# Patient Record
Sex: MALE | Race: ASIAN | Employment: FULL TIME | URBAN - METROPOLITAN AREA
[De-identification: names, ages, dates, MRNs, and addresses within clinical notes are randomized per-mention and may not be internally consistent; named-entity substitution may affect disease eponyms.]

---

## 2020-07-19 ENCOUNTER — HOSPITAL ENCOUNTER (EMERGENCY)
Facility: CLINIC | Age: 40
Discharge: HOME | End: 2020-07-19
Attending: EMERGENCY MEDICINE | Admitting: EMERGENCY MEDICINE
Payer: COMMERCIAL

## 2020-07-19 ENCOUNTER — APPOINTMENT (OUTPATIENT)
Dept: CT IMAGING | Facility: CLINIC | Age: 40
End: 2020-07-19
Attending: EMERGENCY MEDICINE
Payer: COMMERCIAL

## 2020-07-19 VITALS
DIASTOLIC BLOOD PRESSURE: 75 MMHG | TEMPERATURE: 97.9 F | RESPIRATION RATE: 16 BRPM | SYSTOLIC BLOOD PRESSURE: 130 MMHG | WEIGHT: 166.01 LBS | HEART RATE: 64 BPM | BODY MASS INDEX: 25.24 KG/M2 | OXYGEN SATURATION: 100 %

## 2020-07-19 DIAGNOSIS — R30.0 DYSURIA: ICD-10-CM

## 2020-07-19 DIAGNOSIS — N34.2 URETHRITIS: ICD-10-CM

## 2020-07-19 LAB
ALBUMIN UR-MCNC: NEGATIVE MG/DL
ANION GAP SERPL CALCULATED.3IONS-SCNC: 4 MMOL/L (ref 3–14)
APPEARANCE UR: CLEAR
BACTERIA #/AREA URNS HPF: ABNORMAL /HPF
BASOPHILS # BLD AUTO: 0 10E9/L (ref 0–0.2)
BASOPHILS NFR BLD AUTO: 0.5 %
BILIRUB UR QL STRIP: NEGATIVE
BUN SERPL-MCNC: 10 MG/DL (ref 7–30)
CALCIUM SERPL-MCNC: 8.9 MG/DL (ref 8.5–10.1)
CHLORIDE SERPL-SCNC: 106 MMOL/L (ref 94–109)
CO2 SERPL-SCNC: 30 MMOL/L (ref 20–32)
COLOR UR AUTO: ABNORMAL
CREAT SERPL-MCNC: 0.72 MG/DL (ref 0.66–1.25)
DIFFERENTIAL METHOD BLD: NORMAL
EOSINOPHIL # BLD AUTO: 0.1 10E9/L (ref 0–0.7)
EOSINOPHIL NFR BLD AUTO: 1.2 %
ERYTHROCYTE [DISTWIDTH] IN BLOOD BY AUTOMATED COUNT: 12.1 % (ref 10–15)
GFR SERPL CREATININE-BSD FRML MDRD: >90 ML/MIN/{1.73_M2}
GLUCOSE SERPL-MCNC: 98 MG/DL (ref 70–99)
GLUCOSE UR STRIP-MCNC: NEGATIVE MG/DL
HCT VFR BLD AUTO: 45.5 % (ref 40–53)
HGB BLD-MCNC: 14.4 G/DL (ref 13.3–17.7)
HGB UR QL STRIP: NEGATIVE
IMM GRANULOCYTES # BLD: 0 10E9/L (ref 0–0.4)
IMM GRANULOCYTES NFR BLD: 0.5 %
KETONES UR STRIP-MCNC: NEGATIVE MG/DL
LACTATE BLD-SCNC: 1 MMOL/L (ref 0.7–2)
LEUKOCYTE ESTERASE UR QL STRIP: ABNORMAL
LYMPHOCYTES # BLD AUTO: 1.7 10E9/L (ref 0.8–5.3)
LYMPHOCYTES NFR BLD AUTO: 27.4 %
MCH RBC QN AUTO: 28.5 PG (ref 26.5–33)
MCHC RBC AUTO-ENTMCNC: 31.6 G/DL (ref 31.5–36.5)
MCV RBC AUTO: 90 FL (ref 78–100)
MONOCYTES # BLD AUTO: 0.6 10E9/L (ref 0–1.3)
MONOCYTES NFR BLD AUTO: 9.2 %
MUCOUS THREADS #/AREA URNS LPF: PRESENT /LPF
NEUTROPHILS # BLD AUTO: 3.7 10E9/L (ref 1.6–8.3)
NEUTROPHILS NFR BLD AUTO: 61.2 %
NITRATE UR QL: NEGATIVE
NRBC # BLD AUTO: 0 10*3/UL
NRBC BLD AUTO-RTO: 0 /100
PH UR STRIP: 7 PH (ref 5–7)
PLATELET # BLD AUTO: 303 10E9/L (ref 150–450)
POTASSIUM SERPL-SCNC: 3.8 MMOL/L (ref 3.4–5.3)
RBC # BLD AUTO: 5.05 10E12/L (ref 4.4–5.9)
RBC #/AREA URNS AUTO: 1 /HPF (ref 0–2)
SODIUM SERPL-SCNC: 140 MMOL/L (ref 133–144)
SOURCE: ABNORMAL
SP GR UR STRIP: 1.01 (ref 1–1.03)
UROBILINOGEN UR STRIP-MCNC: NORMAL MG/DL (ref 0–2)
WBC # BLD AUTO: 6.1 10E9/L (ref 4–11)
WBC #/AREA URNS AUTO: 10 /HPF (ref 0–5)

## 2020-07-19 PROCEDURE — 80048 BASIC METABOLIC PNL TOTAL CA: CPT | Performed by: EMERGENCY MEDICINE

## 2020-07-19 PROCEDURE — 87591 N.GONORRHOEAE DNA AMP PROB: CPT | Performed by: EMERGENCY MEDICINE

## 2020-07-19 PROCEDURE — 87086 URINE CULTURE/COLONY COUNT: CPT | Performed by: EMERGENCY MEDICINE

## 2020-07-19 PROCEDURE — 87491 CHLMYD TRACH DNA AMP PROBE: CPT | Performed by: EMERGENCY MEDICINE

## 2020-07-19 PROCEDURE — 81001 URINALYSIS AUTO W/SCOPE: CPT | Performed by: EMERGENCY MEDICINE

## 2020-07-19 PROCEDURE — 99285 EMERGENCY DEPT VISIT HI MDM: CPT | Mod: 25

## 2020-07-19 PROCEDURE — 96365 THER/PROPH/DIAG IV INF INIT: CPT

## 2020-07-19 PROCEDURE — 87088 URINE BACTERIA CULTURE: CPT | Performed by: EMERGENCY MEDICINE

## 2020-07-19 PROCEDURE — 74177 CT ABD & PELVIS W/CONTRAST: CPT

## 2020-07-19 PROCEDURE — 25000125 ZZHC RX 250: Performed by: EMERGENCY MEDICINE

## 2020-07-19 PROCEDURE — 25000128 H RX IP 250 OP 636: Performed by: EMERGENCY MEDICINE

## 2020-07-19 PROCEDURE — 83605 ASSAY OF LACTIC ACID: CPT | Performed by: EMERGENCY MEDICINE

## 2020-07-19 PROCEDURE — 85025 COMPLETE CBC W/AUTO DIFF WBC: CPT | Performed by: EMERGENCY MEDICINE

## 2020-07-19 PROCEDURE — 87186 SC STD MICRODIL/AGAR DIL: CPT | Performed by: EMERGENCY MEDICINE

## 2020-07-19 RX ORDER — DOXYCYCLINE 100 MG/1
100 CAPSULE ORAL 2 TIMES DAILY
Qty: 14 CAPSULE | Refills: 0 | Status: SHIPPED | OUTPATIENT
Start: 2020-07-19 | End: 2020-07-26

## 2020-07-19 RX ORDER — IOPAMIDOL 755 MG/ML
83 INJECTION, SOLUTION INTRAVASCULAR ONCE
Status: COMPLETED | OUTPATIENT
Start: 2020-07-19 | End: 2020-07-19

## 2020-07-19 RX ADMIN — IOPAMIDOL 83 ML: 755 INJECTION, SOLUTION INTRAVENOUS at 13:44

## 2020-07-19 RX ADMIN — LIDOCAINE HYDROCHLORIDE 250 MG: 10 INJECTION, SOLUTION EPIDURAL; INFILTRATION; INTRACAUDAL; PERINEURAL at 15:26

## 2020-07-19 NOTE — ED PROVIDER NOTES
History   Chief Complaint  Dysuria    HPI   Jongchandler Valdovinos is a 40 year old male who presents to the emergency department for evaluation of dysuria.  Patient reports 3 weeks ago he was diagnosed with a infection was given antibiotics.  He was taking those medication for approximately 10 days.  After that time his symptoms resolved for about a week and a half.  Starting Thursday developed a low-grade fever 99.4.  Friday he then noticed that he developed some dysuria.  He has had some discomfort to the tip of the penis.  He denies any penile discharge.  Denies any testicular pain.  Denies any rectal pressure or pain.  Denies any vomiting, nausea, diarrhea, back pain.  Patient reports no concerns about his partner and is not concerned about STDs.  He is from [place] and recently was evaluated at [facility].  That is where he was initially treated for the infection.  He denies any fevers or chills or chest pain or shortness of breath.        Allergies  NKDA    Medications  The patient is currently on no regular medications.    Past Medical History  The patient denies any significant past medical history.    Past Surgical History  Vasectomy    Family History  Diabetes  Breast cancer  Hyperlipidemia    Social History  Smoking Status: Never Smoker  Smokeless Tobacco: Never used  Alcohol Use: Yes   Marital Status:   [2]    Review of Systems   All other systems reviewed and are negative.    Physical Exam     Patient Vitals for the past 24 hrs:   BP Temp Temp src Heart Rate Resp SpO2 Weight   07/19/20 1300 124/78 97.9  F (36.6  C) Oral 68 16 98 % --   07/19/20 1058 133/85 98.4  F (36.9  C) Temporal 63 18 98 % 75.3 kg (166 lb 0.1 oz)     Physical Exam    General: Resting on the bed.  Head: No obvious trauma to head.  Ears, Nose, Throat:  External ears normal.  Nose normal.    Eyes:  Conjunctivae clear.  Pupils are equal, round, and reactive.   Neck: Normal range of motion.  Neck supple.   CV: Regular  rate and rhythm.  No murmurs.      Respiratory: Effort normal and breath sounds normal.  No wheezing or crackles.   Gastrointestinal: Soft.  No distension. There is mild lower bilateral tenderness.  There is no rigidity, no rebound and no guarding.   Musculoskeletal: No CVA tenderness  Neuro: Alert. Moving all extremities appropriately.  Normal speech.    Skin: Skin is warm and dry.  No rash noted.   ; nontender, non-boggy prostate on digital rectal exam.  Nontender testicles bilateral palpation, no abnormal skin changes, no erythema or warmth.  Nontender penis to palpation, mild discomfort to the urethral meatus.    Emergency Department Course     Imaging:  Imaging findings were communicated with the patient who voiced understanding of the findings.    CT Abdomen Pelvis w Contrast  IMPRESSION:   1.  No specific CT findings to explain the patient's symptoms.    Readings per Radiology    Laboratory:  Laboratory findings were communicated with the patient who voiced understanding of the findings.    BMP: Glucose 98, o/w WNL (Creatinine: 0.72)  CBC: WBC: 6.1, HGB: 14.4, PLT: 303  Lactic acid (Resulted at 1144): 1.0    UA with Microscopic: Leukocyte Esterase: Trace (A), WBC: 10 (high), Bacteria: Few (A), Mucous: Present (A), o/w WNL    Interventions:  1526 Rocephin 250 mg in lidocaine IV    Emergency Department Course:  Past medical records, nursing notes, and vitals reviewed.    1100 I physically examined the patient as documented above.    IV was inserted and blood was drawn for laboratory testing, results above.  The patient provided a urine sample here in the emergency department. This was sent for laboratory testing, findings above.  The patient was sent for radiographs while in the emergency department, results above.     1559 I rechecked the patient and discussed the findings of their workup thus far. Jong denies any abdominal pain currently.     Findings and plan explained to the Patient. Patient discharged  home with instructions regarding supportive care, medications, and reasons to return. The importance of close follow-up was reviewed. The patient was prescribed Vibramycin.    I personally reviewed the laboratory and imaging results with the Patient and answered all related questions prior to discharge.     Impression & Plan   Medical Decision Making:  Jong Valdovinos is a 40 year old male who presents for evaluation of dysuria with discomfort at the tip of his penis.  Broad differential was considered including not limited to urethritis, UTI, pyelonephritis, nephrolithiasis, prostatitis, infected stone, diverticulitis, colitis, etc.  CBC shows no leukocytosis or anemia.  BMP shows no acute electrolyte, metabolic or renal dysfunction.  Lactates normal not concerning for severe sepsis or septic shock.  UA has trace leuk esterase and 10 white blood cells.  Gonorrhea and Chlamydia cultures are sent.  Patient is very concerned about his possible lower belly pain.  CT shows no evidence intra-abdominal process.  No evidence of acute surgical emergency.  Prostate is not boggy or tender today to indicate prostatitis.  UA does not appear consistent with UTI.  Given the mild evidence of inflammation in the tenderness at the urethral meatus this is consistent with urethritis.  Given symptoms will treat as urethritis.  No definitive signs of other STI's although patient cautioned he needs testing for HIV, syphilis, and hepatitis.  UA shows Leukocyte.  Will culture urine.  No signs of systemic symptoms.  No signs of pyelonephritis. advised that he follow-up as an outpatient with urology.  Strict return precautions were discussed.    Critical Care Time: was 0 minutes for this patient excluding procedures    Diagnosis:    ICD-10-CM    1. Dysuria  R30.0    2. Urethritis  N34.2        Disposition:  Discharged to home.    Discharge Medications:  New Prescriptions    DOXYCYCLINE HYCLATE (VIBRAMYCIN) 100 MG CAPSULE    Take 1  capsule (100 mg) by mouth 2 times daily for 7 days       Scribe Disclosure:  I, Nathan Ontiveros, am serving as a scribe at 11:35 AM on 7/19/2020 to document services personally performed by Hiral Miller MD based on my observations and the provider's statements to me.      Hiral Miller MD  07/19/20 2008

## 2020-07-19 NOTE — ED AVS SNAPSHOT
[facility] Emergency Department  [address]  [facility] [address]  Phone:  [phone]  Fax:  [phone]                                    Jong Valdovinos   MRN: [id number]    Department:  [facility] Emergency Department   Date of Visit:  7/19/2020           After Visit Summary Signature Page    I have received my discharge instructions, and my questions have been answered. I have discussed any challenges I see with this plan with the nurse or doctor.    ..........................................................................................................................................  Patient/Patient Representative Signature      ..........................................................................................................................................  Patient Representative Print Name and Relationship to Patient    ..................................................               ................................................  Date                                   Time    ..........................................................................................................................................  Reviewed by Signature/Title    ...................................................              ..............................................  Date                                               Time          22EPIC Rev 08/18

## 2020-07-19 NOTE — RESULT ENCOUNTER NOTE
Emergency Dept/Urgent Care discharge antibiotic (if prescribed): Doxycycline 100 mg PO tablet, 1 tablet (100 mg) by mouth 2 times daily for 7 days  Date of Rx (if applicable):  7/19/20  No changes in treatment per Urine culture protocol.

## 2020-07-19 NOTE — DISCHARGE INSTRUCTIONS
Please follow-up with urology in the next 2 to 3 days to ensure that your symptoms are improving.  If you develop fevers, nausea, vomiting, diarrhea, intractable pain return to the ER.  If you are unable to urinate return to the ER.    Gonorrhea and Chlamydia testing was done, they will call you within 48 hours if positive.  If negative they will not call you.

## 2020-07-20 LAB
C TRACH DNA SPEC QL NAA+PROBE: NEGATIVE
N GONORRHOEA DNA SPEC QL NAA+PROBE: NEGATIVE
SPECIMEN SOURCE: NORMAL
SPECIMEN SOURCE: NORMAL

## 2020-07-20 NOTE — RESULT ENCOUNTER NOTE
Final result for both N. Gonorrhoeae PCR and Chlamydia Trachomatis PCR are NEGATIVE.  No treatment or change in treatment per [place] ED Lab Result protocol.

## 2020-07-21 LAB
BACTERIA SPEC CULT: ABNORMAL
BACTERIA SPEC CULT: ABNORMAL
Lab: ABNORMAL
SPECIMEN SOURCE: ABNORMAL

## 2020-07-22 ENCOUNTER — TELEPHONE (OUTPATIENT)
Dept: EMERGENCY MEDICINE | Facility: CLINIC | Age: 40
End: 2020-07-22

## 2020-07-22 DIAGNOSIS — N39.0 URINARY TRACT INFECTION: ICD-10-CM

## 2020-07-22 RX ORDER — SULFAMETHOXAZOLE/TRIMETHOPRIM 800-160 MG
1 TABLET ORAL 2 TIMES DAILY
Qty: 6 TABLET | Refills: 0 | Status: SHIPPED | OUTPATIENT
Start: 2020-07-22 | End: 2020-07-25

## 2020-07-22 NOTE — TELEPHONE ENCOUNTER
[facility] notification [Adult-[facility]]    [place] ED lab result protocol used  Urine Culture Protocol    Reason for call  Notify of lab results, assess symptoms,  review ED providers recommendations/discharge instructions (if necessary) and advise per ED lab result f/u protocol    Lab Result (including Rx patient on, if applicable)  Final Urine Culture Report on 7/21/20.   Emergency Dept/Urgent Care discharge antibiotic prescribed: Doxycycline 100 mg PO tablet, 1 tablet (100 mg) by mouth 2 times daily for 7 days   Bacteria #1: >100,000 colonies/mL Klebsiella pneumoniae is [NOT TESTED] to antibiotic   Bacteria #2: 10,000 - 50,000 colonies/mL Klebsiella pneumoniae is [NOT TESTED] to antibiotic   Recommendations in treatment per  ED Lab result protocol.    Information table from ED Provider visit on 7/19/20  Symptoms reported at ED visit (Chief complaint, HPI) Jong Valdovinos is a 40 year old male who presents to the emergency department for evaluation of dysuria.  Patient reports 3 weeks ago he was diagnosed with a infection was given antibiotics.  He was taking those medication for approximately 10 days.  After that time his symptoms resolved for about a week and a half.  Starting Thursday developed a low-grade fever 99.4.  Friday he then noticed that he developed some dysuria.  He has had some discomfort to the tip of the penis.  He denies any penile discharge.  Denies any testicular pain.  Denies any rectal pressure or pain.  Denies any vomiting, nausea, diarrhea, back pain.  Patient reports no concerns about his partner and is not concerned about STDs.  He is from [place] and recently was evaluated at [facility].  That is where he was initially treated for the infection.  He denies any fevers or chills or chest pain or shortness of breath.     Significant Medical hx, if applicable (i.e. CKD, diabetes) None   Allergies No Known Allergies   Weight, if  applicable Wt Readings from Last 2 Encounters:   07/19/20 75.3 kg (166 lb 0.1 oz)   12/14/13 77.1 kg (170 lb)      Coumadin/Warfarin [Yes /No] No   Creatinine Level (mg/dl) Creatinine   Date Value Ref Range Status   07/19/2020 0.72 0.66 - 1.25 mg/dL Final      Creatinine clearance (ml/min), if applicable Creatinine clearance cannot be calculated (Unknown ideal weight.)   ED providers Impression and Plan (applicable information) Jong Valdovinos is a 40 year old male who presents for evaluation of dysuria with discomfort at the tip of his penis.  Broad differential was considered including not limited to urethritis, UTI, pyelonephritis, nephrolithiasis, prostatitis, infected stone, diverticulitis, colitis, etc.  CBC shows no leukocytosis or anemia.  BMP shows no acute electrolyte, metabolic or renal dysfunction.  Lactates normal not concerning for severe sepsis or septic shock.  UA has trace leuk esterase and 10 white blood cells.  Gonorrhea and Chlamydia cultures are sent.  Patient is very concerned about his possible lower belly pain.  CT shows no evidence intra-abdominal process.  No evidence of acute surgical emergency.  Prostate is not boggy or tender today to indicate prostatitis.  UA does not appear consistent with UTI.  Given the mild evidence of inflammation in the tenderness at the urethral meatus this is consistent with urethritis.  Given symptoms will treat as urethritis.  No definitive signs of other STI's although patient cautioned he needs testing for HIV, syphilis, and hepatitis.  UA shows Leukocyte.  Will culture urine.  No signs of systemic symptoms.  No signs of pyelonephritis. advised that he follow-up as an outpatient with urology.  Strict return precautions were discussed.   ED diagnosis  Dysuria    Urethritis   ED provider Hiral Miller MD      RN Assessment (Patient s current Symptoms), include time called.  [Insert Left message here if message left]  Jong today reports he is  "\"doing better\", having diarrhea.  Urination pain is \"gone\" right now.  No fever to report now.  Most recent infection he was treated for was possibly a UTI, was prescribed Bactrim.  Questions answered.  Recommendations reviewed as per ED provider recommendation.  Information on preventing future UTI's:  Drink plenty of fluids such as water, juice, or other caffeine-free drinks. This helps flush bacteria out of your system.  Empty your bladder when you feel the urge to urinate and before going to sleep. Urine that stays in your bladder promotes infection.  Use condoms during sex. These help prevent UTIs caused by sexually transmitted bacteria.  Keep follow-up appointments with your health care provider. He or she can may do tests to make sure the infection has cleared. If necessary, additional treatment can be started.         RN Recommendations/Instructions per [place] ED lab result protocol  Patient notified of lab result and treatment recommendations.  Rx for Bactrim sent to [Pharmacy - [facility]].  RN reviewed information about continuing Doxycycline as prescribed.          [facility] Emergency Department Provider Name & Recommendations (included time consulted)  Did speak with Dr. Miller at 10:31 am.   To complete Doxycycline, to add abx as per urine culture protocol.       Please Contact your PCP clinic or return to the Emergency department if your:    Symptoms return.    Symptoms do not resolve after completing antibiotic.    Symptoms worsen or other concerning symptom's.    PCP follow-up Questions asked: YES         Genny Mc RN    [facility]   Lung Nodule and ED Lab Results F/U RN  Epic pool (ED late result f/u RN) : [id number]   # [phone]    Contains abnormal data  Urine Culture   Order: [id number]   Status:  Final result   Visible to patient:  No (not released) Dx:  Dysuria   Specimen Information:  Midstream Urine          Component  3d ago   Specimen Description  Midstream Urine   "   Special Requests  Specimen received in preservative     Culture Micro  Abnormal     >100,000 colonies/mL   Klebsiella pneumoniae     Culture Micro  Abnormal     10,000 to 50,000 colonies/mL   Strain 2   Klebsiella pneumoniae     Resulting Agency  [facility]    Susceptibility      Klebsiella pneumoniae (1)      SHAILA      AMPICILLIN  >=32 ug/mL  Resistant1      AMPICILLIN/SULBACTAM  8 ug/mL  Sensitive      CEFAZOLIN  <=4 ug/mL  Sensitive2      CEFEPIME  <=1 ug/mL  Sensitive      CEFOXITIN  8 ug/mL  Sensitive      CEFTAZIDIME  <=1 ug/mL  Sensitive      CEFTRIAXONE  <=1 ug/mL  Sensitive      CIPROFLOXACIN  1 ug/mL  Resistant      GENTAMICIN  <=1 ug/mL  Sensitive      LEVOFLOXACIN  2 ug/mL  Resistant      NITROFURANTOIN  256 ug/mL  Resistant      Piperacillin/Tazo  8 ug/mL  Sensitive      TOBRAMYCIN  <=1 ug/mL  Sensitive      Trimethoprim/Sulfa  <=1/19 ug/mL  Sensitive            1  Intrinsically Resistant    2  Cefazolin SHAILA breakpoints are for the treatment of uncomplicated urinary tract    infections.  For the treatment of systemic infections, please contact the   laboratory for additional testing.       Susceptibility      Klebsiella pneumoniae (2)      SHAILA      AMPICILLIN  >=32 ug/mL  Resistant1      AMPICILLIN/SULBACTAM  4 ug/mL  Sensitive      CEFAZOLIN  <=4 ug/mL  Sensitive2      CEFEPIME  <=1 ug/mL  Sensitive      CEFOXITIN  16 ug/mL  Intermediate      CEFTAZIDIME  <=1 ug/mL  Sensitive      CEFTRIAXONE  <=1 ug/mL  Sensitive      CIPROFLOXACIN  <=0.25 ug/mL  Sensitive      GENTAMICIN  <=1 ug/mL  Sensitive      LEVOFLOXACIN  <=0.12 ug/mL  Sensitive      NITROFURANTOIN  64 ug/mL  Intermediate      Piperacillin/Tazo  8 ug/mL  Sensitive      TOBRAMYCIN  <=1 ug/mL  Sensitive      Trimethoprim/Sulfa  <=1/19 ug/mL  Sensitive            1  Intrinsically Resistant    2  Cefazolin SHAILA breakpoints are for the treatment of uncomplicated urinary tract    infections.  For the treatment of  systemic infections, please contact the   laboratory for additional testing.

## 2020-12-13 ENCOUNTER — HEALTH MAINTENANCE LETTER (OUTPATIENT)
Age: 40
End: 2020-12-13

## 2021-05-12 NOTE — ED TRIAGE NOTES
A&Ox4. ABCs intact. Pt c/o painful urination with fever 99.6 max, pain with bladder even without urination, fatigue.  Fever started Thursday.  Painful urination started Friday evening.    Completed ATBs about 7 days ago for similar symptoms that started 3 weeks ago.   Never

## 2021-09-26 ENCOUNTER — HEALTH MAINTENANCE LETTER (OUTPATIENT)
Age: 41
End: 2021-09-26

## 2022-01-16 ENCOUNTER — HEALTH MAINTENANCE LETTER (OUTPATIENT)
Age: 42
End: 2022-01-16

## 2023-01-14 ENCOUNTER — HEALTH MAINTENANCE LETTER (OUTPATIENT)
Age: 43
End: 2023-01-14

## 2023-04-23 ENCOUNTER — HEALTH MAINTENANCE LETTER (OUTPATIENT)
Age: 43
End: 2023-04-23